# Patient Record
Sex: FEMALE | Race: WHITE
[De-identification: names, ages, dates, MRNs, and addresses within clinical notes are randomized per-mention and may not be internally consistent; named-entity substitution may affect disease eponyms.]

---

## 2018-12-09 ENCOUNTER — HOSPITAL ENCOUNTER (EMERGENCY)
Dept: HOSPITAL 36 - ER | Age: 58
Discharge: HOME | End: 2018-12-09
Payer: MEDICAID

## 2018-12-09 DIAGNOSIS — G51.0: ICD-10-CM

## 2018-12-09 DIAGNOSIS — I10: Primary | ICD-10-CM

## 2018-12-09 LAB
ALBUMIN SERPL-MCNC: 4.3 GM/DL (ref 3.7–5.3)
ALBUMIN/GLOB SERPL: 1.3 {RATIO} (ref 1–1.8)
ALP SERPL-CCNC: 53 U/L (ref 34–104)
ALT SERPL-CCNC: 11 U/L (ref 7–52)
ANION GAP SERPL CALC-SCNC: 12.9 MMOL/L (ref 7–16)
APPEARANCE UR: CLEAR
AST SERPL-CCNC: 14 U/L (ref 13–39)
BACTERIA #/AREA URNS HPF: (no result) /HPF
BASOPHILS # BLD AUTO: 0 TH/CUMM (ref 0–0.2)
BASOPHILS NFR BLD AUTO: 0 % (ref 0–2)
BILIRUB SERPL-MCNC: 0.4 MG/DL (ref 0.3–1)
BILIRUB UR-MCNC: NEGATIVE MG/DL
BUN SERPL-MCNC: 7 MG/DL (ref 7–25)
CALCIUM SERPL-MCNC: 9.4 MG/DL (ref 8.6–10.3)
CHLORIDE SERPL-SCNC: 103 MEQ/L (ref 98–107)
CO2 SERPL-SCNC: 25.6 MEQ/L (ref 21–31)
COLOR UR: YELLOW
CREAT SERPL-MCNC: 0.6 MG/DL (ref 0.6–1.2)
EOSINOPHIL # BLD AUTO: 0.1 TH/CMM (ref 0.1–0.4)
EOSINOPHIL NFR BLD AUTO: 0.7 % (ref 0–5)
EPI CELLS URNS QL MICRO: (no result) /LPF
ERYTHROCYTE [DISTWIDTH] IN BLOOD BY AUTOMATED COUNT: 12.7 % (ref 11.5–20)
GLOBULIN SER-MCNC: 3.2 GM/DL
GLUCOSE SERPL-MCNC: 125 MG/DL (ref 70–105)
GLUCOSE UR STRIP-MCNC: NEGATIVE MG/DL
HCT VFR BLD CALC: 41.3 % (ref 41–60)
HGB BLD-MCNC: 13.8 GM/DL (ref 12–16)
KETONES UR STRIP-MCNC: NEGATIVE MG/DL
LEUKOCYTE ESTERASE UR-ACNC: NEGATIVE
LYMPHOCYTE AB SER FC-ACNC: 1.2 TH/CMM (ref 1.5–3)
LYMPHOCYTES NFR BLD AUTO: 14.7 % (ref 20–50)
MAGNESIUM SERPL-MCNC: 2.3 MG/DL (ref 1.9–2.7)
MCH RBC QN AUTO: 29.6 PG (ref 27–31)
MCHC RBC AUTO-ENTMCNC: 33.4 PG (ref 28–36)
MCV RBC AUTO: 88.5 FL (ref 81–100)
MICRO URNS: YES
MONOCYTES # BLD AUTO: 0.7 TH/CMM (ref 0.3–1)
MONOCYTES NFR BLD AUTO: 8.4 % (ref 2–10)
NEUTROPHILS # BLD: 6.4 TH/CMM (ref 1.8–8)
NEUTROPHILS NFR BLD AUTO: 76.2 % (ref 40–80)
NITRITE UR QL STRIP: NEGATIVE
PH UR STRIP: 6.5 [PH] (ref 4.6–8)
PHOSPHATE SERPL-MCNC: 3.1 MG/DL (ref 2.5–5)
PLATELET # BLD: 247 TH/CMM (ref 150–400)
PMV BLD AUTO: 8.9 FL
POTASSIUM SERPL-SCNC: 3.5 MEQ/L (ref 3.5–5.1)
PROT UR STRIP-MCNC: NEGATIVE MG/DL
RBC # BLD AUTO: 4.67 MIL/CMM (ref 3.8–5.1)
RBC # UR STRIP: (no result) /UL
RBC #/AREA URNS HPF: (no result) /HPF (ref 0–5)
SODIUM SERPL-SCNC: 138 MEQ/L (ref 136–145)
SP GR UR STRIP: <= 1.005 (ref 1–1.03)
URINALYSIS COMPLETE PNL UR: (no result)
UROBILINOGEN UR STRIP-ACNC: 0.2 E.U./DL (ref 0.2–1)
WBC # BLD AUTO: 8.4 TH/CMM (ref 4.8–10.8)
WBC #/AREA URNS HPF: (no result) /HPF (ref 0–5)

## 2018-12-09 PROCEDURE — Z7610: HCPCS

## 2018-12-09 PROCEDURE — Z7502: HCPCS

## 2018-12-09 NOTE — ED PHYSICIAN CHART
ED Chief Complaint/HPI





- Patient Information


Allergies:: 


 Allergies











Allergy/AdvReac Type Severity Reaction Status Date / Time


 


No Known Allergies Allergy   Verified 12/09/18 18:41











Vitals:: 


 Vital Signs - 8 hr











  12/09/18 12/09/18





  18:41 19:03


 


Temp 99.7 F 


 


 100


 


RR 18 


 


/108 


 


O2 Sat % 98 














<Francia Larsen - Last Filed: 12/09/18 19:32>





- Patient Information


Date Seen:: 12/09/18


Time Seen:: 18:41


Chief Complaint:: high blood pressure


History of Present Illness:: 





high blood pressure in a patient who has a doctor in [place].  She takes 

Losartan 50 mg a day.  





denies chest pain or shortness of breath.





No other complaints except for headache.


Allergies:: 


 Allergies











Allergy/AdvReac Type Severity Reaction Status Date / Time


 


No Known Allergies Allergy   Verified 12/09/18 18:41











Vitals:: 


 Vital Signs - 8 hr











  12/09/18





  18:41


 


Temp 99.7 F


 





 


RR 18


 


/108


 


O2 Sat % 98











Historian:: Patient, Family Member


Review:: Nurse's Note Reviewed





<Alexia Ivan - Last Filed: 12/11/18 09:36>





ED Review of Systems





- Review of Systems


General/Constitutional: No fever, No chills, No weight loss, No weakness, No 

diaphoresis, No edema, No loss of appetite


Skin: No skin lesions, No rash, No bruising


Head: Headache, No light-headedness


Eyes: No loss of vision, No pain, No diplopia


ENT: No earache, No nasal drainage, No sore throat, No tinnitus


Neck: No neck pain, No swelling, No thyromegaly, No stiffness, No mass noted


Cardio Vascular: No chest pain, No palpitations, No PND, No orthopnea, No edema


Pulmonary: No SOB, No cough, No sputum, No wheezing


GI: No nausea, No vomiting, No diarrhea, No pain, No melena, No hematochezia, 

No constipation, No hematemesis


G/U: No dysuria, No frequency, No hematuria


Musculoskeletal: No bone or joint pain, No back pain, No muscle pain


Endocrine: No polyuria, No polydipsia


Psychiatric: No prior psych history, No depression, No anxiety, No suicidal 

ideation


Hematopoietic: No bruising, No lymphadenopathy


Allergic/Immuno: No urticaria, No angioedema


Neurological: No syncope, No focal symptoms, No weakness, No paresthesia, No 

headache, No seizure, No dizziness, No confusion, No vertigo





<Alexia Ivan - Last Filed: 12/11/18 09:36>





ED Past Medical History





- Past Medical History


Obtainable: Yes


Past Medical History: HTN, Other (left sided Bell's palsy)





<Alexia Ivan - Last Filed: 12/11/18 09:36>





Family Medical History





- Family Member


  ** Mother


History Unknown: Yes





<Alexia Ivan - Last Filed: 12/11/18 09:36>





ED Physical Exam





- Physical Examination


General/Constitutional: Awake, Well-developed, well-nourished, Alert, No 

distress, GCS 15, Non-toxic appearing, Ambulatory


Head: Atraumatic


Other Head comments:: 


Left marginal mandibular palsy and left lower side of mouth facial twitching (

residual from Bell's).


Eyes: Lids, conjuctiva normal, PERRL, EOMI


Skin: Nl inspection, No rash, No skin lesions, No ecchymosis, Well hydrated, No 

lymphadenopathy


ENMT: External ears, nose nl


Other ENMT comments:: 


nontender TMJ's when I have the fingers in the ear canals.





complete loss of molars in mouth (mandibular area)--suspect grinding.





L sided marginal mandibular weakness.  slight twitching to the left side of the 

mouth (residual from L Barajas's palsy)


Neck: Nontender, Full ROM w/o pain, No JVD, No nuchal rigidity, No bruit, No 

mass, No stridor


Respiratory: Nl effort/Exclusion, Clear to Auscultation, No Wheeze/Rhonchi/Rales


Cardio Vascular: RRR, No murmur, gallop, rubs, NL S1 S2


Extremities: No tenderness or effusion, Full ROM, normal strength in all 

extremities, No edema, Normal digits & nails


Neuro/Psych: Alert/oriented, Normal sensory exam, Normal motor strength, 

Judgement/insight normal, Mood normal, Normal gait, No focal deficits





<Alexia Ivan Last Filed: 12/11/18 09:36>





ED Labs/Radiology/EKG Results





- Lab Results


Results: 


 Laboratory Tests











  12/09/18





  19:00


 


WBC  8.4


 


RBC  4.67


 


Hgb  13.8


 


Hct  41.3


 


MCV  88.5


 


MCH  29.6


 


MCHC Differential  33.4


 


RDW  12.7


 


Plt Count  247


 


MPV  8.9


 


Neutrophils %  76.2


 


Lymphocytes %  14.7 L


 


Monocytes %  8.4


 


Eosinophils %  0.7


 


Basophils %  0.0














<Francia Larsen - Last Filed: 12/09/18 19:32>





ED Assessment





- Assessment


General Assessment: 


sign out given to Dr. Larsen at 7 p.m. who will check out the labs and dispo 

the patient with blood pressure medication prescription.





**





If lab wok returns normal, I would consider TELLING THE PATIENT TO STOP TAKING 

THE LOSARTAN AND START AMLODIPINE 5/BENAZEPRIL 20 MG IN THE MORNING.





**





I would also give the patient referral to Dr. Hampton and to Dr. Oseguera for 

follow up so that she can get a doctor in the [place] (she should tell 

them her insurance before she goes to appointment).





**





NO SALT AND NO CAFFEINE.





<Alexia Ivan - Last Filed: 12/11/18 09:36>





ED Septic Shock





- <6hrs of presentation:


Vital Signs: 


 Vital Signs - 8 hr











  12/09/18 12/09/18





  18:41 19:03


 


Temp 99.7 F 


 


 100


 


RR 18 


 


/108 


 


O2 Sat % 98 














<Francia Larsen - Last Filed: 12/09/18 19:32>





- .


Is Septic Shock (SBP<90, OR Lactate>4 mmol\L) present?: No





- <6hrs of presentation:


Vital Signs: 


 Vital Signs - 8 hr











  12/09/18





  18:41


 


Temp 99.7 F


 





 


RR 18


 


/108


 


O2 Sat % 98














<Alexia Ivan - Last Filed: 12/11/18 09:36>





ED Reassessment (Disposition)





- Aftercare/Follow up Instructions


Aftercare/Follow-Up Instructions:: Counseled pt regarding lab results/diagnosis 

& need follow up





- Patient Disposition


Discharge/Transfer:: Home


Condition at Disposition:: Stable (PT STATES HEADACHE IMPROVED BP COMING DOWN 

EXAMSTABLE RX AMYLODIPINE AND BENAZEPRIL F/U DR HAMPTON AND BRANDEN)





<Francia Larsen - Last Filed: 12/09/18 19:32>





- Diagnosis


Diagnosis:: 


Hypertension





Residual of left sided bell's palsy.





- Aftercare/Follow up Instructions


Aftercare/Follow-Up Instructions:: Refer to Discharge Instructions





<Alexia Ivan - Last Filed: 12/11/18 09:36>